# Patient Record
Sex: MALE | HISPANIC OR LATINO | Employment: FULL TIME | URBAN - METROPOLITAN AREA
[De-identification: names, ages, dates, MRNs, and addresses within clinical notes are randomized per-mention and may not be internally consistent; named-entity substitution may affect disease eponyms.]

---

## 2020-10-02 ENCOUNTER — OFFICE VISIT (OUTPATIENT)
Dept: URGENT CARE | Facility: URGENT CARE | Age: 25
End: 2020-10-02
Payer: OTHER GOVERNMENT

## 2020-10-02 VITALS
WEIGHT: 140 LBS | OXYGEN SATURATION: 93 % | TEMPERATURE: 97.4 F | DIASTOLIC BLOOD PRESSURE: 73 MMHG | HEART RATE: 110 BPM | SYSTOLIC BLOOD PRESSURE: 121 MMHG

## 2020-10-02 DIAGNOSIS — R79.81 BORDERLINE LOW O2 SATURATION: ICD-10-CM

## 2020-10-02 DIAGNOSIS — R06.02 SOB (SHORTNESS OF BREATH): ICD-10-CM

## 2020-10-02 DIAGNOSIS — J45.41 MODERATE PERSISTENT ASTHMA WITH EXACERBATION: Primary | ICD-10-CM

## 2020-10-02 DIAGNOSIS — Z20.822 SUSPECTED COVID-19 VIRUS INFECTION: ICD-10-CM

## 2020-10-02 PROCEDURE — U0003 INFECTIOUS AGENT DETECTION BY NUCLEIC ACID (DNA OR RNA); SEVERE ACUTE RESPIRATORY SYNDROME CORONAVIRUS 2 (SARS-COV-2) (CORONAVIRUS DISEASE [COVID-19]), AMPLIFIED PROBE TECHNIQUE, MAKING USE OF HIGH THROUGHPUT TECHNOLOGIES AS DESCRIBED BY CMS-2020-01-R: HCPCS | Performed by: INTERNAL MEDICINE

## 2020-10-02 PROCEDURE — 99204 OFFICE O/P NEW MOD 45 MIN: CPT | Performed by: INTERNAL MEDICINE

## 2020-10-02 RX ORDER — PREDNISONE 20 MG/1
40 TABLET ORAL DAILY
Qty: 10 TABLET | Refills: 0 | Status: SHIPPED | OUTPATIENT
Start: 2020-10-02 | End: 2020-10-07

## 2020-10-02 RX ORDER — ALBUTEROL SULFATE 90 UG/1
2 AEROSOL, METERED RESPIRATORY (INHALATION) EVERY 6 HOURS
Qty: 18 G | Refills: 0 | Status: SHIPPED | OUTPATIENT
Start: 2020-10-02

## 2020-10-02 RX ORDER — ALBUTEROL SULFATE 0.83 MG/ML
2.5 SOLUTION RESPIRATORY (INHALATION) EVERY 6 HOURS PRN
Qty: 1 BOX | Refills: 0 | Status: SHIPPED | OUTPATIENT
Start: 2020-10-02

## 2020-10-02 RX ORDER — FLUTICASONE PROPIONATE AND SALMETEROL 113; 14 UG/1; UG/1
1 POWDER, METERED RESPIRATORY (INHALATION) 2 TIMES DAILY
Qty: 1 INHALER | Refills: 0 | Status: SHIPPED | OUTPATIENT
Start: 2020-10-02

## 2020-10-02 ASSESSMENT — ENCOUNTER SYMPTOMS
STRIDOR: 1
HEADACHES: 0
ADENOPATHY: 0
COUGH: 1
MYALGIAS: 0
FEVER: 0
DIARRHEA: 0
EYE DISCHARGE: 0
PALPITATIONS: 0
WHEEZING: 1
DIZZINESS: 0
SLEEP DISTURBANCE: 0
RHINORRHEA: 0

## 2020-10-03 NOTE — PROGRESS NOTES
SUBJECTIVE:   Jez Infante is a 25 year old male presenting with a chief complaint of   Chief Complaint   Patient presents with     Urgent Care     Asthma     c/o SOB and refills       He is a new patient of [facility].    Adult    Onset of symptoms was 2 day(s) ago.  Course of illness is worsening.      Current and Associated symptoms: shortness of breath  Treatment measures tried include Inhaler (name: albuterol).    Predisposing factors include ill contact: none, seasonal allergies and HX of asthma.    Occasional vaping    Review of Systems   Constitutional: Negative for fever.   HENT: Negative for rhinorrhea.    Eyes: Negative for discharge.   Respiratory: Positive for cough (mild productive), wheezing and stridor.    Cardiovascular: Negative for chest pain and palpitations.   Gastrointestinal: Negative for diarrhea.   Musculoskeletal: Negative for myalgias.   Skin: Negative for rash.   Allergic/Immunologic: Positive for environmental allergies (not bothering him).   Neurological: Negative for dizziness and headaches.   Hematological: Negative for adenopathy.   Psychiatric/Behavioral: Negative for sleep disturbance.       Past Medical History:   Diagnosis Date     Moderate persistent asthma with acute exacerbation      Family History   Problem Relation Age of Onset     Asthma Other         cousin     Current Outpatient Medications   Medication Sig Dispense Refill     albuterol (PROAIR HFA/PROVENTIL HFA/VENTOLIN HFA) 108 (90 Base) MCG/ACT inhaler Inhale 2 puffs into the lungs every 6 hours 18 g 0     albuterol (PROVENTIL) (2.5 MG/3ML) 0.083% neb solution Take 1 vial (2.5 mg) by nebulization every 6 hours as needed for shortness of breath / dyspnea or wheezing 1 Box 0     fluticasone-salmeterol (AIRDUO RESPICLICK) 113-14 MCG/ACT inhaler Inhale 1 puff into the lungs 2 times daily 1 Inhaler 0     predniSONE (DELTASONE) 20 MG tablet Take 2 tablets (40 mg) by mouth daily for 5 days 10 tablet 0     Social History      Tobacco Use     Smoking status: Never Smoker     Smokeless tobacco: Never Used     Tobacco comment: vaping   Substance Use Topics     Alcohol use: Not on file       OBJECTIVE  /73   Pulse 110   Temp 97.4  F (36.3  C) (Oral)   Wt 63.5 kg (140 lb)   SpO2 93%   Recheck o2 sats 93-95%, fluctuating.  Physical Exam  Vitals signs reviewed.   Constitutional:       General: He is not in acute distress.     Appearance: Normal appearance. He is not ill-appearing, toxic-appearing or diaphoretic.   HENT:      Right Ear: Tympanic membrane normal.      Left Ear: Tympanic membrane normal.      Nose: Nose normal.      Mouth/Throat:      Mouth: Mucous membranes are moist.      Pharynx: Oropharynx is clear.   Eyes:      Conjunctiva/sclera: Conjunctivae normal.   Cardiovascular:      Rate and Rhythm: Normal rate and regular rhythm.      Pulses: Normal pulses.      Heart sounds: Normal heart sounds.   Pulmonary:      Breath sounds: Wheezing present.      Comments: Tight air sounds  Skin:     Findings: No rash.   Neurological:      General: No focal deficit present.      Mental Status: He is alert.   Psychiatric:         Mood and Affect: Mood normal.         Labs:  No results found for this or any previous visit (from the past 24 hour(s)).    X-Ray was not done.    ASSESSMENT:      ICD-10-CM    1. Moderate persistent asthma with exacerbation  J45.41 predniSONE (DELTASONE) 20 MG tablet     fluticasone-salmeterol (AIRDUO RESPICLICK) 113-14 MCG/ACT inhaler     albuterol (PROAIR HFA/PROVENTIL HFA/VENTOLIN HFA) 108 (90 Base) MCG/ACT inhaler     albuterol (PROVENTIL) (2.5 MG/3ML) 0.083% neb solution     Symptomatic COVID-19 Virus (Coronavirus) by PCR   2. Borderline low O2 saturation  R79.81    3. SOB (shortness of breath)  R06.02    4. Suspected COVID-19 virus infection  Z20.828         Medical Decision Making:  Severe asthma exacerbation with difficulty breathing and O2 sats fluctuating 93% mainly  Due to symptoms of shortness  of breath COVID testing performed      PLAN:   Prednisone 5-day burst  Prescribed daily asthma medicine.  Concern for insurance coverage.  States he may have just gotten [facility]  Albuterol inhaler  Alb neb prescription (has neb machine available) - new tubing dispensed      Followup:  Next week      Patient Instructions         Albuterol inhaler 2 puffs every 4-6 hours as needed.  Prednisone 40 mg 5 day burst  Daily airduo like advair    Check [facility].    Recheck next week with Alfonso GAVIRIA    To ER if not improved over next 1-2 days, sooner if worse or concerns.

## 2020-10-03 NOTE — PATIENT INSTRUCTIONS
Albuterol inhaler 2 puffs every 4-6 hours as needed.  Prednisone 40 mg 5 day burst  Daily airduo like advair    Check [facility].    Recheck next week with Alfonso GAVIRIA    To ER if not improved over next 1-2 days, sooner if worse or concerns.

## 2020-10-04 LAB
SARS-COV-2 RNA SPEC QL NAA+PROBE: NOT DETECTED
SPECIMEN SOURCE: NORMAL

## 2021-06-26 ENCOUNTER — HOSPITAL ENCOUNTER (EMERGENCY)
Facility: CLINIC | Age: 26
Discharge: HOME | End: 2021-06-26
Attending: EMERGENCY MEDICINE | Admitting: EMERGENCY MEDICINE
Payer: COMMERCIAL

## 2021-06-26 VITALS
DIASTOLIC BLOOD PRESSURE: 78 MMHG | TEMPERATURE: 100.3 F | HEART RATE: 70 BPM | SYSTOLIC BLOOD PRESSURE: 116 MMHG | RESPIRATION RATE: 18 BRPM | OXYGEN SATURATION: 99 %

## 2021-06-26 DIAGNOSIS — G40.909 SEIZURE DISORDER (H): ICD-10-CM

## 2021-06-26 DIAGNOSIS — Z20.822 COVID-19 RULED OUT BY LABORATORY TESTING: ICD-10-CM

## 2021-06-26 LAB
AMPHETAMINES UR QL SCN: NEGATIVE
ANION GAP SERPL CALCULATED.3IONS-SCNC: 9 MMOL/L (ref 3–14)
BARBITURATES UR QL: NEGATIVE
BASOPHILS # BLD AUTO: 0.1 10E9/L (ref 0–0.2)
BASOPHILS NFR BLD AUTO: 0.3 %
BENZODIAZ UR QL: NEGATIVE
BUN SERPL-MCNC: 13 MG/DL (ref 7–30)
CALCIUM SERPL-MCNC: 8.4 MG/DL (ref 8.5–10.1)
CANNABINOIDS UR QL SCN: POSITIVE
CHLORIDE SERPL-SCNC: 107 MMOL/L (ref 94–109)
CO2 SERPL-SCNC: 24 MMOL/L (ref 20–32)
COCAINE UR QL: NEGATIVE
CREAT SERPL-MCNC: 0.86 MG/DL (ref 0.66–1.25)
DIFFERENTIAL METHOD BLD: ABNORMAL
EOSINOPHIL # BLD AUTO: 0.2 10E9/L (ref 0–0.7)
EOSINOPHIL NFR BLD AUTO: 1.4 %
ERYTHROCYTE [DISTWIDTH] IN BLOOD BY AUTOMATED COUNT: 13.2 % (ref 10–15)
ETHANOL UR QL SCN: NEGATIVE
GFR SERPL CREATININE-BSD FRML MDRD: >90 ML/MIN/{1.73_M2}
GLUCOSE SERPL-MCNC: 90 MG/DL (ref 70–99)
HCT VFR BLD AUTO: 41.8 % (ref 40–53)
HGB BLD-MCNC: 14.2 G/DL (ref 13.3–17.7)
IMM GRANULOCYTES # BLD: 0.1 10E9/L (ref 0–0.4)
IMM GRANULOCYTES NFR BLD: 0.4 %
LABORATORY COMMENT REPORT: NORMAL
LYMPHOCYTES # BLD AUTO: 1.3 10E9/L (ref 0.8–5.3)
LYMPHOCYTES NFR BLD AUTO: 8.9 %
MCH RBC QN AUTO: 29.8 PG (ref 26.5–33)
MCHC RBC AUTO-ENTMCNC: 34 G/DL (ref 31.5–36.5)
MCV RBC AUTO: 88 FL (ref 78–100)
MONOCYTES # BLD AUTO: 1.2 10E9/L (ref 0–1.3)
MONOCYTES NFR BLD AUTO: 8.1 %
NEUTROPHILS # BLD AUTO: 12 10E9/L (ref 1.6–8.3)
NEUTROPHILS NFR BLD AUTO: 80.9 %
NRBC # BLD AUTO: 0 10*3/UL
NRBC BLD AUTO-RTO: 0 /100
OPIATES UR QL SCN: NEGATIVE
PLATELET # BLD AUTO: 241 10E9/L (ref 150–450)
POTASSIUM SERPL-SCNC: 3.8 MMOL/L (ref 3.4–5.3)
RBC # BLD AUTO: 4.76 10E12/L (ref 4.4–5.9)
SARS-COV-2 RNA RESP QL NAA+PROBE: NEGATIVE
SODIUM SERPL-SCNC: 140 MMOL/L (ref 133–144)
SPECIMEN SOURCE: NORMAL
WBC # BLD AUTO: 14.8 10E9/L (ref 4–11)

## 2021-06-26 PROCEDURE — 96360 HYDRATION IV INFUSION INIT: CPT | Performed by: EMERGENCY MEDICINE

## 2021-06-26 PROCEDURE — 99284 EMERGENCY DEPT VISIT MOD MDM: CPT | Mod: 25 | Performed by: EMERGENCY MEDICINE

## 2021-06-26 PROCEDURE — 258N000003 HC RX IP 258 OP 636: Performed by: EMERGENCY MEDICINE

## 2021-06-26 PROCEDURE — 99285 EMERGENCY DEPT VISIT HI MDM: CPT | Performed by: EMERGENCY MEDICINE

## 2021-06-26 PROCEDURE — 80320 DRUG SCREEN QUANTALCOHOLS: CPT | Performed by: EMERGENCY MEDICINE

## 2021-06-26 PROCEDURE — 80048 BASIC METABOLIC PNL TOTAL CA: CPT | Performed by: EMERGENCY MEDICINE

## 2021-06-26 PROCEDURE — 85025 COMPLETE CBC W/AUTO DIFF WBC: CPT | Performed by: EMERGENCY MEDICINE

## 2021-06-26 PROCEDURE — C9803 HOPD COVID-19 SPEC COLLECT: HCPCS | Performed by: EMERGENCY MEDICINE

## 2021-06-26 PROCEDURE — 87635 SARS-COV-2 COVID-19 AMP PRB: CPT | Performed by: EMERGENCY MEDICINE

## 2021-06-26 PROCEDURE — 80307 DRUG TEST PRSMV CHEM ANLYZR: CPT | Performed by: EMERGENCY MEDICINE

## 2021-06-26 RX ADMIN — SODIUM CHLORIDE 1000 ML: 9 INJECTION, SOLUTION INTRAVENOUS at 12:37

## 2021-06-26 ASSESSMENT — ENCOUNTER SYMPTOMS
SEIZURES: 1
CONFUSION: 0
MYALGIAS: 0
ABDOMINAL PAIN: 0
SHORTNESS OF BREATH: 0
FEVER: 0

## 2021-06-26 NOTE — ED PROVIDER NOTES
"    [facility] EMERGENCY DEPARTMENT ([facility])       6/26/21  History     Chief Complaint   Patient presents with     Seizures     Pt had witnessed seizure at home last night, had seizure in the past from benzo withdrawal, pts girlfriend concerned and wanted him to come in      The history is provided by the patient and medical records.     Jez Infante is a 25 year old male who presents to the Emergency Department for evaluation after a possible seizure last night. Patient is accompanied to the ED by his girlfriend who witnessed the seizure-like movements last night. She reports that the patient was playing video games last night when he had suddenly dropped his controller started \"shaking\". She reports that the patient became diaphoretic, tremulous, and rigid for approximately 1-2 minutes. She reports the patient was then fairly confused for approximately 5 minutes, and then began to \"come to\". Patient reports he does not remember this yesterday. Patient states that he has had 2 seizures in the past. Patient does report a history of seizures 2/2 benzo withdrawal in the past approximately 2 years ago. He is not completely sure if both of these seizures were secondary to benzodiazepine withdrawal or not. He did have a significant work-up in [place] with neurology approximately 2 years ago. Of note, patient is currently taking Wellbutrin and Zoloft. He reports he has been on Wellbutrin for roughly 1 year. He states that this medication is helpful in giving him more motivation. The patient's girlfriend reports that the patient did stop taking his Wellbutrin due to forgetting a few days last week, and recently restarted towards the end of the week. Patient denies any recent alcohol use, drug use, or benzodiazepine use. Patient reports he is prescribed Valium, and states that he last took this approximately 2 weeks ago. He reports he is prescribed this for anxiety, but does not take this often as he " does not think it is helpful. Patient did not exhibit any tongue biting, loss of bowel/bladder. He reports he just remembers waking up. Patient denies any myalgias. Patient denies any recent stressors, or prolonged lack of sleep. Patient's girlfriend reports that the patient has been trying to find a job which has been increasingly stressful for him. Patient denies any family history of seizures or epilepsy in the past.      I have reviewed the Medications, Allergies, Past Medical and Surgical History, and Social History in the [employer] system.  PAST MEDICAL HISTORY:   Past Medical History:   Diagnosis Date     Moderate persistent asthma with acute exacerbation        PAST SURGICAL HISTORY: No past surgical history on file.    Past medical history, past surgical history, medications, and allergies were reviewed with the patient. Additional pertinent items: None    FAMILY HISTORY:   Family History   Problem Relation Age of Onset     Asthma Other         cousin       SOCIAL HISTORY:   Social History     Tobacco Use     Smoking status: Never Smoker     Smokeless tobacco: Never Used     Tobacco comment: vaping   Substance Use Topics     Alcohol use: Not on file     Social history was reviewed with the patient. Additional pertinent items: None      Patient's Medications   New Prescriptions    No medications on file   Previous Medications    ALBUTEROL (PROAIR HFA/PROVENTIL HFA/VENTOLIN HFA) 108 (90 BASE) MCG/ACT INHALER    Inhale 2 puffs into the lungs every 6 hours    ALBUTEROL (PROVENTIL) (2.5 MG/3ML) 0.083% NEB SOLUTION    Take 1 vial (2.5 mg) by nebulization every 6 hours as needed for shortness of breath / dyspnea or wheezing    FLUTICASONE-SALMETEROL (AIRDUO RESPICLICK) 113-14 MCG/ACT INHALER    Inhale 1 puff into the lungs 2 times daily   Modified Medications    No medications on file   Discontinued Medications    No medications on file        No Known Allergies     Review of Systems   Constitutional: Negative for  fever.   Respiratory: Negative for shortness of breath.    Cardiovascular: Negative for chest pain.   Gastrointestinal: Negative for abdominal pain.   Musculoskeletal: Negative for myalgias.   Skin: Negative for rash.   Neurological: Positive for seizures.   Psychiatric/Behavioral: Negative for confusion and suicidal ideas.   All other systems reviewed and are negative.      Physical Exam   BP: 109/42(checked on right arm.)  Pulse: 107  Temp: 100.3  F (37.9  C)  Resp: 18  SpO2: 97 %      Physical Exam  Vitals signs and nursing note reviewed.   Constitutional:       General: He is not in acute distress.     Appearance: He is not diaphoretic.   HENT:      Head: Normocephalic and atraumatic.   Eyes:      General: No scleral icterus.     Pupils: Pupils are equal, round, and reactive to light.   Cardiovascular:      Heart sounds: Normal heart sounds.   Pulmonary:      Effort: No respiratory distress.      Breath sounds: Normal breath sounds.   Abdominal:      General: Bowel sounds are normal.      Palpations: Abdomen is soft.      Tenderness: There is no abdominal tenderness.   Musculoskeletal: Normal range of motion.         General: No tenderness.   Skin:     General: Skin is warm.      Findings: No rash.   Neurological:      General: No focal deficit present.      Mental Status: He is oriented to person, place, and time. Mental status is at baseline.      Cranial Nerves: No cranial nerve deficit.      Sensory: No sensory deficit.      Coordination: Coordination normal.      Gait: Gait normal.   Psychiatric:         Mood and Affect: Mood normal.         Behavior: Behavior normal.         Thought Content: Thought content normal.         ED Course     12:20 PM  The patient was seen and examined by Maury Olivo MD in Room ED09.        Procedures             The medical record was reviewed and interpreted.  Current labs reviewed and interpreted.  Current images reviewed and interpreted: CT head normal.         No  results found for this or any previous visit (from the past 24 hour(s)).  Medications - No data to display          Assessments & Plan (with Medical Decision Making)   Jez is a 26 yo M with a history of seizures who presents after a witnessed sieuzure. He has a history of seizures related to benzodiazapine withdrawal.  He states he is taking valium, but not in the last 2 weeks.  His lab evlauation was normal.  He had an elevated temperarture of 100.3, but no symptoms of an infection.  There is certainly no evidence of meningitis. A COVID test is pending.  I spoke with Neurology regarding his seizures.  Antiepileptics were not recommended given his history of provoked seizures.  He was instructed to avoid benzodiazepines and he will follow up with Neurology.      I have reviewed the nursing notes.    I have reviewed the findings, diagnosis, plan and need for follow up with the patient.    New Prescriptions    No medications on file       Final diagnoses:   None       Tadeo BERNARD am serving as a trained medical scribe to document services personally performed by Maury Olivo MD, based on the provider's statements to me.      Maury BERNARD MD, was physically present and have reviewed and verified the accuracy of this note documented by Tadeo Desai.     6/26/2021   [facility] EMERGENCY DEPARTMENT     Maury Olivo MD  06/26/21 0669

## 2021-07-06 ENCOUNTER — VIRTUAL VISIT (OUTPATIENT)
Dept: NEUROLOGY | Facility: CLINIC | Age: 26
End: 2021-07-06
Payer: COMMERCIAL

## 2021-07-06 DIAGNOSIS — Z53.9 NO SHOW: Primary | ICD-10-CM

## 2021-07-06 NOTE — LETTER
2021       RE: Jez Infante  : 1995   MRN: [id number]      Dear Colleague,    Thank you for referring your patient, Jez Infante, to the [facility] EPILEPSY [facility]ARE at [facility]. Please see a copy of my visit note below.    Called patient, unable to leave a message, mailbox is full.    Unable to leave a message, patient's mailbox is full.    Called patient,  Unable to leave a message patient's mailbox on his cell phone is full.      Again, thank you for allowing me to participate in the care of your patient.      Sincerely,    Ai Bran MD

## 2021-08-11 ENCOUNTER — TRANSFERRED RECORDS (OUTPATIENT)
Dept: HEALTH INFORMATION MANAGEMENT | Facility: CLINIC | Age: 26
End: 2021-08-11

## 2021-08-19 ENCOUNTER — HOSPITAL ENCOUNTER (EMERGENCY)
Facility: CLINIC | Age: 26
Discharge: HOME | End: 2021-08-19
Attending: EMERGENCY MEDICINE | Admitting: EMERGENCY MEDICINE
Payer: COMMERCIAL

## 2021-08-19 VITALS
DIASTOLIC BLOOD PRESSURE: 68 MMHG | OXYGEN SATURATION: 100 % | RESPIRATION RATE: 16 BRPM | SYSTOLIC BLOOD PRESSURE: 108 MMHG | HEART RATE: 106 BPM | TEMPERATURE: 98 F

## 2021-08-19 DIAGNOSIS — F10.20 ACUTE ALCOHOLISM (H): ICD-10-CM

## 2021-08-19 DIAGNOSIS — F32.9 MAJOR DEPRESSIVE DISORDER, SINGLE EPISODE, UNSPECIFIED: ICD-10-CM

## 2021-08-19 DIAGNOSIS — F19.10 POLYSUBSTANCE ABUSE (H): ICD-10-CM

## 2021-08-19 DIAGNOSIS — F41.1 GENERALIZED ANXIETY DISORDER: ICD-10-CM

## 2021-08-19 DIAGNOSIS — F12.10 CANNABIS ABUSE, CONTINUOUS: ICD-10-CM

## 2021-08-19 LAB
ALCOHOL BREATH TEST: 0 (ref 0–0.01)
AMPHETAMINES UR QL SCN: ABNORMAL
BARBITURATES UR QL: ABNORMAL
BENZODIAZ UR QL: ABNORMAL
CANNABINOIDS UR QL SCN: ABNORMAL
COCAINE UR QL: ABNORMAL
HOLD SPECIMEN: NORMAL
OPIATES UR QL SCN: ABNORMAL

## 2021-08-19 PROCEDURE — 99285 EMERGENCY DEPT VISIT HI MDM: CPT | Mod: 25

## 2021-08-19 PROCEDURE — 250N000013 HC RX MED GY IP 250 OP 250 PS 637: Performed by: EMERGENCY MEDICINE

## 2021-08-19 PROCEDURE — 82075 ASSAY OF BREATH ETHANOL: CPT

## 2021-08-19 PROCEDURE — 80307 DRUG TEST PRSMV CHEM ANLYZR: CPT | Performed by: EMERGENCY MEDICINE

## 2021-08-19 PROCEDURE — 99284 EMERGENCY DEPT VISIT MOD MDM: CPT | Performed by: EMERGENCY MEDICINE

## 2021-08-19 PROCEDURE — 90791 PSYCH DIAGNOSTIC EVALUATION: CPT

## 2021-08-19 RX ORDER — GABAPENTIN 300 MG/1
300 CAPSULE ORAL 3 TIMES DAILY PRN
COMMUNITY
Start: 2021-08-16

## 2021-08-19 RX ORDER — SERTRALINE HYDROCHLORIDE 100 MG/1
200 TABLET, FILM COATED ORAL
COMMUNITY
Start: 2021-08-09

## 2021-08-19 RX ORDER — ATOMOXETINE 40 MG/1
40 CAPSULE ORAL DAILY
COMMUNITY
Start: 2021-08-17

## 2021-08-19 RX ORDER — GABAPENTIN 300 MG/1
300 CAPSULE ORAL ONCE
Status: COMPLETED | OUTPATIENT
Start: 2021-08-19 | End: 2021-08-19

## 2021-08-19 RX ADMIN — GABAPENTIN 300 MG: 300 CAPSULE ORAL at 12:58

## 2021-08-19 ASSESSMENT — ENCOUNTER SYMPTOMS
CARDIOVASCULAR NEGATIVE: 1
SEIZURES: 0
CONSTITUTIONAL NEGATIVE: 1
PSYCHIATRIC NEGATIVE: 1
GASTROINTESTINAL NEGATIVE: 1
MUSCULOSKELETAL NEGATIVE: 1
RESPIRATORY NEGATIVE: 1

## 2021-08-19 NOTE — ED TRIAGE NOTES
Pt has been using alcohol, kratum, and pot.  Pt wants to get into detox.  Pt last use alcohol last night.  Pt states also that he has a seizure disorder and is not on hi seizure med.

## 2021-08-19 NOTE — ED PROVIDER NOTES
[facility] EMERGENCY DEPARTMENT ([facility])  8/19/21    History     Chief Complaint   Patient presents with     Drug / Alcohol Assessment     HPI  Jez Infante is a 26 year old male with a past medical history significant for seizures related to benzodiazepine withdrawal and asthma who presents to the Emergency Department for a drug and alcohol assessment.  He is not a heavy user of alcohol he has been smoking fentanyl and using kratom.  [facility] was [facility] but was sent here for concerns of possible withdrawal.  Based on my evaluation I do not think he is can have significant withdrawal symptoms he does not drink enough alcohol for that he is also not in withdrawal from a benzodiazepine perspective.  He has had 2 seizures in the past that may have been related to Wellbutrin he was put on gabapentin but is not using it consistently last use was yesterday I will give him his 300 mg dose now I think he is cleared to [facility].          Past Medical History  Past Medical History:   Diagnosis Date     Moderate persistent asthma with acute exacerbation      Rhabdomyolysis     2017; from dehydration     Seizures (H)     last one july 27; possibly r/t wellbutrin per pt doc, has d/c'd med     History reviewed. No pertinent surgical history.  albuterol (PROAIR HFA/PROVENTIL HFA/VENTOLIN HFA) 108 (90 Base) MCG/ACT inhaler  atomoxetine (STRATTERA) 40 MG capsule  fluticasone-salmeterol (AIRDUO RESPICLICK) 113-14 MCG/ACT inhaler  gabapentin (NEURONTIN) 300 MG capsule  sertraline (ZOLOFT) 100 MG tablet  albuterol (PROVENTIL) (2.5 MG/3ML) 0.083% neb solution      No Known Allergies  Family History  Family History   Problem Relation Age of Onset     Asthma Other         cousin     Social History   Social History     Tobacco Use     Smoking status: Current Every Day Smoker     Packs/day: 0.25     Smokeless tobacco: Never Used     Tobacco comment: vaping   Substance Use Topics      Alcohol use: Yes     Comment: 10 40's/week     Drug use: Yes     Types: Opiates, Fentanyl     Comment: kratom, heroin      Past medical history, past surgical history, medications, allergies, family history, and social history were reviewed with the patient. No additional pertinent items.       Review of Systems   Constitutional: Negative.    HENT: Negative.    Respiratory: Negative.    Cardiovascular: Negative.    Gastrointestinal: Negative.    Genitourinary: Negative.    Musculoskeletal: Negative.    Neurological: Negative for seizures.   Psychiatric/Behavioral: Negative.    All other systems reviewed and are negative.    A complete review of systems was performed with pertinent positives and negatives noted in the HPI, and all other systems negative.    Physical Exam   BP: 108/68  Pulse: 106  Temp: 98  F (36.7  C)  Resp: 16  SpO2: 100 %  Physical Exam  Vitals and nursing note reviewed.   Constitutional:       General: He is not in acute distress.     Appearance: He is well-developed.   HENT:      Head: Normocephalic and atraumatic.      Mouth/Throat:      Mouth: Mucous membranes are moist.   Eyes:      General: No scleral icterus.     Conjunctiva/sclera: Conjunctivae normal.      Pupils: Pupils are equal, round, and reactive to light.   Cardiovascular:      Rate and Rhythm: Normal rate and regular rhythm.      Heart sounds: Normal heart sounds.   Pulmonary:      Effort: Pulmonary effort is normal. No respiratory distress.      Breath sounds: Normal breath sounds. No wheezing.   Abdominal:      General: Abdomen is flat.      Palpations: Abdomen is soft.   Musculoskeletal:      Cervical back: Neck supple.   Skin:     General: Skin is warm and dry.   Neurological:      General: No focal deficit present.      Mental Status: He is alert and oriented to person, place, and time.      Cranial Nerves: No cranial nerve deficit.      Deep Tendon Reflexes: Abnormal reflex: Tato Scanlon MD.   Psychiatric:         Mood  and Affect: Mood normal.         Behavior: Behavior normal.         ED Course      Procedures       Medications   gabapentin (NEURONTIN) capsule 300 mg (has no administration in time range)          Results for orders placed or performed during the hospital encounter of 08/19/21   Drug abuse screen 1 urine (ED)     Status: Abnormal   Result Value Ref Range    Amphetamines Urine Screen Negative Screen Negative    Barbiturates Urine Screen Negative Screen Negative    Benzodiazepines Urine Screen Negative Screen Negative    Cannabinoids Urine Screen Positive (A) Screen Negative    Cocaine Urine Screen Negative Screen Negative    Opiates Urine Screen Negative Screen Negative   Extra Red Top Tube     Status: None   Result Value Ref Range    Hold Specimen JIC    Extra Green Top (Lithium Heparin) Tube     Status: None   Result Value Ref Range    Hold Specimen JIC    Extra Purple Top Tube     Status: None   Result Value Ref Range    Hold Specimen JIC    Extra Green Top (Lithium Heparin) ON ICE     Status: None   Result Value Ref Range    Hold Specimen JIC    Alcohol breath test POCT     Status: Normal   Result Value Ref Range    Alcohol Breath Test 0 0.00 - 0.01   Urine Drugs of Abuse Screen     Status: Abnormal    Narrative    The following orders were created for panel order Urine Drugs of Abuse Screen.  Procedure                               Abnormality         Status                     ---------                               -----------         ------                     Drug abuse screen 1 urin...[[id number]]  Abnormal            Final result                 Please view results for these tests on the individual orders.   [place] Draw     Status: None    Narrative    The following orders were created for panel order [place] Draw.  Procedure                               Abnormality         Status                     ---------                               -----------         ------                     Extra Red Top  Tube[[id number]]                               Final result               Extra Green Top (Lithium...[[id number]]                      Final result               Extra Purple Top Tube[[id number]]                            Final result               Extra Green Top (Lithium...[[id number]]                      Final result                 Please view results for these tests on the individual orders.     Medications   gabapentin (NEURONTIN) capsule 300 mg (has no administration in time range)        Assessments & Plan (with Medical Decision Making)   46-year-old male motivated to [facility] he has been abusing alcohol marijuana fentanyl and kratom.  None of these require a detox admission.  I believe he is clear and safe to be [facility].  He should take the gabapentin he was prescribed I gave him 1 300 mg dose in the department and will be discharged with instructions that he is cleared to enter [facility].    I have reviewed the nursing notes. I have reviewed the findings, diagnosis, plan and need for follow up with the patient.    New Prescriptions    No medications on file       Final diagnoses:   None       --  Tato Scanlon MD  [facility] EMERGENCY DEPARTMENT  8/19/2021     Tato Scanlon MD  08/19/21 [id number]

## 2021-08-19 NOTE — ED NOTES
Pt here seeking detox. States was referred to [facility] who requested pt first come here for detox. Former heroin user, quit in 2020, clean for over a year, recent relapse July 3-5 on heroin and fentanyl.
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
## 2021-08-19 NOTE — DISCHARGE INSTRUCTIONS
Jez is medically [facility].  It is very unlikely that he will experience significant withdrawal symptoms

## 2021-08-23 ENCOUNTER — HOSPITAL ENCOUNTER (OUTPATIENT)
Dept: BEHAVIORAL HEALTH | Facility: CLINIC | Age: 26
End: 2021-08-23
Attending: FAMILY MEDICINE
Payer: COMMERCIAL

## 2021-08-23 VITALS
TEMPERATURE: 98.1 F | HEIGHT: 70 IN | WEIGHT: 135 LBS | SYSTOLIC BLOOD PRESSURE: 114 MMHG | BODY MASS INDEX: 19.33 KG/M2 | HEART RATE: 100 BPM | OXYGEN SATURATION: 100 % | DIASTOLIC BLOOD PRESSURE: 73 MMHG

## 2021-08-23 DIAGNOSIS — G40.909 RECURRENT SEIZURES (H): Primary | ICD-10-CM

## 2021-08-23 DIAGNOSIS — F17.200 NICOTINE DEPENDENCE: Primary | ICD-10-CM

## 2021-08-23 PROBLEM — F19.20 CHEMICAL DEPENDENCY (H): Status: ACTIVE | Noted: 2021-08-23

## 2021-08-23 LAB — SARS-COV-2 RNA RESP QL NAA+PROBE: NEGATIVE

## 2021-08-23 PROCEDURE — H2035 A/D TX PROGRAM, PER HOUR: HCPCS

## 2021-08-23 PROCEDURE — U0005 INFEC AGEN DETEC AMPLI PROBE: HCPCS | Performed by: FAMILY MEDICINE

## 2021-08-23 PROCEDURE — H2035 A/D TX PROGRAM, PER HOUR: HCPCS | Mod: HQ

## 2021-08-23 RX ORDER — LORATADINE 10 MG/1
10 TABLET ORAL DAILY
COMMUNITY

## 2021-08-23 RX ORDER — IBUPROFEN 200 MG
400 TABLET ORAL EVERY 6 HOURS PRN
COMMUNITY

## 2021-08-23 RX ORDER — MAGNESIUM HYDROXIDE/ALUMINUM HYDROXICE/SIMETHICONE 120; 1200; 1200 MG/30ML; MG/30ML; MG/30ML
30 SUSPENSION ORAL EVERY 6 HOURS PRN
COMMUNITY

## 2021-08-23 RX ORDER — LANOLIN ALCOHOL/MO/W.PET/CERES
3 CREAM (GRAM) TOPICAL
COMMUNITY

## 2021-08-23 RX ORDER — AMOXICILLIN 250 MG
2 CAPSULE ORAL DAILY PRN
COMMUNITY

## 2021-08-23 RX ORDER — LEVETIRACETAM 500 MG/1
500 TABLET ORAL 2 TIMES DAILY
Qty: 20 TABLET | Refills: 0 | Status: SHIPPED | OUTPATIENT
Start: 2021-08-23

## 2021-08-23 RX ORDER — ACETAMINOPHEN 325 MG/1
325-650 TABLET ORAL EVERY 4 HOURS PRN
COMMUNITY

## 2021-08-23 ASSESSMENT — ANXIETY QUESTIONNAIRES
6. BECOMING EASILY ANNOYED OR IRRITABLE: MORE THAN HALF THE DAYS
GAD7 TOTAL SCORE: 15
2. NOT BEING ABLE TO STOP OR CONTROL WORRYING: NEARLY EVERY DAY
1. FEELING NERVOUS, ANXIOUS, OR ON EDGE: NEARLY EVERY DAY
IF YOU CHECKED OFF ANY PROBLEMS ON THIS QUESTIONNAIRE, HOW DIFFICULT HAVE THESE PROBLEMS MADE IT FOR YOU TO DO YOUR WORK, TAKE CARE OF THINGS AT HOME, OR GET ALONG WITH OTHER PEOPLE: VERY DIFFICULT
7. FEELING AFRAID AS IF SOMETHING AWFUL MIGHT HAPPEN: MORE THAN HALF THE DAYS
4. TROUBLE RELAXING: SEVERAL DAYS
3. WORRYING TOO MUCH ABOUT DIFFERENT THINGS: SEVERAL DAYS
5. BEING SO RESTLESS THAT IT IS HARD TO SIT STILL: NEARLY EVERY DAY

## 2021-08-23 ASSESSMENT — MIFFLIN-ST. JEOR: SCORE: 1593.61

## 2021-08-23 ASSESSMENT — PATIENT HEALTH QUESTIONNAIRE - PHQ9: SUM OF ALL RESPONSES TO PHQ QUESTIONS 1-9: 12
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
## 2021-08-23 NOTE — PROGRESS NOTES
At 1340 Pt came to this writer in the 6th floor office and said  Bro, I need my cell phone because I am out . Pt said he is going to his girl place. Pt request staff to keep his small suitcase bag for a day. Writer put his bag in 6th floor belonging room with his name sticker on it. Pt will call the coordinator phone number to . Pt left [facility] unit at 1350. Notified to LPRN and counselor.

## 2021-08-23 NOTE — PROGRESS NOTES
Restarted keppra @ 500mg BID - see ED note from Aug 2 regarding unprovoked seizure hx. Possibly related in the past to BZD withdrawal, wellbutrin and/or gabapentin use. No seizures reported since Aug 2. Last use of substances was xanax 3 days ago, and he hasn't taken this consistently over the past month. BZD screen negative today. EtOH screen negative on 8/19 and again today.    Will need f/up with PCP or neurology ASAP to address ongoing use of keppra. Given his current undifferentiated workup, it would not be safe for him to admit without seizure ppx. 10d Rx keppra written, same dose/indications (500mg BID) as was determined at his ED visit Aug 2. He reports losing this Rx and never started this, despite a loading dose of IV keppra in the ED that day.    Antonio Rogers MD

## 2021-08-23 NOTE — GROUP NOTE
Group Therapy Documentation    PATIENT'S NAME: Jez Infante  MRN:   [id number]  :   1995  ACCT. NUMBER: [id number]  DATE OF SERVICE: 21  START TIME: 12:30 PM  END TIME:  2:30 PM  FACILITATOR(S): Rafa Crowley LADC  TOPIC: BEH Group Therapy  Number of patients attending the group:  4  Group Length:  2 Hours    Group Therapy Type: Recovery strategies    Summary of Group / Topics Discussed:    Recovery Principles and Emotions/expression      Group Attendance:  Attended group session for 1 of 2 hours    Patient's response to the group topic/interactions:  cooperative with task    Patient appeared to be Passively engaged.        Client specific details:  Pt was present for 1 of 2 hours of group.

## 2021-08-23 NOTE — PROGRESS NOTES
Progress Note    This patient had a Rule 25/31 Combined assessment form  on 08/12/2021 completed by [facility]  This patient was seen for a face to face update of the Rule 25/31 Combined Assessment Form on 8/23/2021 by FEMI Moe.  OUTSIDE: A paper copy of the Rule 25/31 Combined Assessment Form will be placed in the patient's paper chart until being scanned into the patient's electronic medical record in Epic under the Media tab.    Alcohol/Drug use since the last CD evaluation (include date of last use):     Pt reported his last use of Cannabis was on 8/21/2021, and last use of alcohol was on 8/20/2021. He denies any current withdrawal symptoms.      Please note any other clinical changes since the last CD evaluation (such as medication changes, additional legal charges, detoxification admissions, overdoses, etc.)     No significant changes since the last CD evaluation       ASAM Dimensions Original scores Current Scores   I.) Intoxication and Withdrawal: 1 0   II.) Biomedical:  1 1   III.) Emotional and Behavioral:  2 2   IV.) Readiness to Change:  3 3   V.) Relapse Potential: 4 4   VI.) Recovery Environmental: 4 4     Please list clinical justifications for the above ASAM score changes since the original comprehensive assessment:     The patient's score on Dimension I changed from a 1 to a 0.  The patient had not consumed any alcohol or drugs since 8/21/2021 and he does not appear to have any current risk of having significant withdrawal symptoms.        Current SUNG: Current UA:     0.000     Positive for THC and negative for all other screened drugs.       PHQ-9, BONITA-7   PHQ-9 on 8/23/2021 BONITA-7 on 8/23/2021   The patient's PHQ-9 score was 12 out of 27, indicating moderate depression.   The patient's BONITA-7 score was 15 out of 21, indicating severe anxiety.       [place]-Suicide Severity Rating Scale Reassessment   Have you ever wished you were dead or that you could go to sleep and not wake  up?  Past Month:  No     Have you actually had any thoughts of killing yourself?  Past Month:  No     Have you been thinking about how you might do this?     Past Month:  No   Lifetime:  No   Have you had these thoughts and had some intention of acting on them?     Past Month:  No   Lifetime:  No   Have you started to work out the details of how to kill yourself?   Past Month:  No   Lifetime:  No   Do you intend to carry out this plan?   No     When you have the thoughts how long do they last?  The patient denied ever having any suicidal thoughts in life.     Are there things - anyone or anything (i.e. family, Mu-ism, pain of death) that stopped you from wanting to die or acting on thoughts of suicide?  Does not apply       2008  [employer]  Used with permission by Telma Bryan, PhD.       Guide to C-SSRS Risk Ratings   NO IDEATION:  with no active thoughts IDEATION: with a wish to die. IDEATION: with active thoughts. Risk Ratings   If Yes No No 0 - Very Low Risk   If NA Yes No 1 - Low Risk   If NA Yes Yes 2 - Low/moderate risk   IDEATION: associated thoughts of methods without intent or plan INTENT: Intent to follow through on suicide PLAN: Plan to follow through on suicide Risk Ratings cont...   If Yes No No 3 - Moderate Risk   If Yes Yes No 4 - High Risk   If Yes Yes Yes 5 - High Risk   The patient's ADDITIONAL RISK FACTORS and lack of PROTECTIVE FACTORS may increase their overall suicide risk ratings.     Additional Risk Factors:    Significant history of having untreated or poorly treated mental health symptoms     Tendency to be socially isolated and/or cut off from the support of others     Significant history of trauma and/or abuse issues   Protective Factors:    Having people in his/her life that would prevent the patient from considering a suicide attempt (i.e. young children, spouse, parents, etc.)     Having pet(s) that give companionship and/or would prevent the  "patient from considering a suicide attempt     An absence of chronic health problems or stable and well treated chronic health issues     A positive relationship with his/her clinical medical and/or mental health providers     Having easy access to supportive family members     Having a good community support network     Having cultural, Gnosticist or spiritual beliefs that discourage suicide     Having restricted access to highly lethal means of suicide     Risk Status   0. - Very Low Risk:  Evaluation Counselors:  Document in Epic / SBAR to counselor \"Very Low Risk\".      Treatment Counselors:  Reassess upon admission as applicable, assess weekly in progress notes under Dimension 3 and summarize in Discharge / Treatment summary under Dimension 3.     Additional information to support suicide risk rating: There was no additional information to provide at this time.     Substance Use History Update:           X = Primary Drug Used   Age of First Use Most Recent Pattern of Use and Duration   Need enough information to show pattern (both frequency and amounts) and to show tolerance for each chemical that has a diagnosis   Date of last use and time, if needed   Withdrawal Potential? Requiring special care Method of use  (oral, smoked, snort, IV, etc)      Alcohol     14 CU: 3-4 40 oz beers a week    HU: 2 pints of vodka daily 4 years ago.   8/20/2021 no oral      Marijuana/  Hashish   14 CU: 1/2 oz per week, daily use    HU: 1 oz per week, daily use 4 years ago 8/21/2021 no smoke      Cocaine/Crack     No use          Meth/  Amphetamines   No use          Heroin     19 CU: July 4 relapse, but have not used since.    HU: 1 bundle per day, 2 years ago 7/4/2021 no smoke      Other Opiates/  Synthetics   18 Used Oxy prior to using heroin 5 years ago no snort      Inhalants     No use          Benzodiazepines     17 CU: no use    HU: 2015 5 xanax bars a day 6 years ago no oral      Hallucinogens     14 5 years ago: LSD and " mushrooms, every 3 months 2016 19 oral      Barbiturates/  Sedatives/  Hypnotics No use          Over-the-Counter Drugs   No use          Other     No use          Nicotine     14 CU: Vape a lot    HU: 1 pack per day, 5 years ago 08/23/2021 yes smoke

## 2021-08-23 NOTE — PROGRESS NOTES
Per LPRN pt cannot be readmitted to [facility] until he has completed a neurology referral as recommended in in recent ED visit. Please have pt medically screened if he is ever interested in re-admittng to [facility]
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
## 2021-08-23 NOTE — PROGRESS NOTES
Visit Date: 08/23/2021    EVALUATION COUNSELOR:  Denzel Katz [facility]    TREATMENT COUNSELOR:  KOLE Hernandez, [facility]    REFERRAL SOURCE:  Self.     Program [facility] program admission date 08/23/2021, discharge date 08/23/2021.     ADMISSION DIAGNOSES:  Alcohol use disorder, severe, 303.90/F10.20; cannabis use disorder, severe, 304.30/F12.20; opioid use disorder, severe 304.00/F11.20.    DISCHARGE DIAGNOSES:  Alcohol use disorder, severe, 303.90/F10.20; cannabis use disorder, severe, 304.30/F12.20; opioid use disorder, severe 304.00/F11.20.    DISCHARGE STATUS:  The patient left the program after 1 hour in the program AMA.  Last use date as client reported 08/23/2021.  Days of treatment completed, 0.    PRESENTING INFORMATION:  The patient was referred to the [facility] for chemical dependency treatment.  The patient was admitted on the morning of 08/23/2021.  The patient entered group therapy for 1 hour, left group, never to return and went to the [facility] staff informing them that he was leaving the program AMA.    SERVICES PROVIDED:  Services included assessment and treatment planning.    ISSUES ADDRESSED IN TREATMENT:  No issues were addressed in treatment since the patient was in the program 1 hour.     STRENGTHS:  None shown.    PROGNOSIS:  Unfavorable.    LIVING ARRANGEMENTS AT DISCHARGE:  The patient stated that he had a safe place to go and he would be seeking an easier treatment program.    CONTINUING CARE RECOMMENDATIONS AND REFERRALS:    1.  The patient needs to enter a long-term chemical dependency treatment program.  2.  Per nurse's instructions, the patient cannot be readmitted to the [facility] program until he has completed a Neurology referral as recommended in the recent Emergency Department visit.      This information has been disclosed to you from records protected by Federal confidentiality rules (42 CFR part 2). The Federal rules  prohibit you from making any further disclosure of this information unless further disclosure is expressly permitted by the written consent of the person to whom it pertains or as otherwise permitted by 42 CFR part 2. A general authorization for the release of medical or other information is NOT sufficient for this purpose. The Federal rules restrict any use of the information to criminally investigate or prosecute any alcohol or drug abuse patient.    KOLE Mayers, [facility]        D: 2021   T: 2021   MT: [id number]    Name:     HOMA GONZALEZ MARI  MRN:      [id number]        Account:    [id number]   :      1995           Visit Date: 2021     Document: [id number]

## 2021-08-23 NOTE — PROGRESS NOTES
Name: Jez Infante  Date: 8/23/2021  Medical Record: [id number]  Envelope Number: [id number]  List of Contents (List each item separately in new row):   One Cell Phone, One  & Cord.   Admission:  I am responsible for any personal items that are not sent to the safe or pharmacy.  [facility] is not responsible for loss, theft or damage of any property in my possession.    Patient Signature:  ___________________________________________       Date/Time:__________________________    Staff Signature: __________________________________       Date/Time:__________________________    2nd Staff person, if patient is unable/unwilling to sign:      __________________________________________________________       Date/Time: __________________________    Discharge:  [facility] has returned all of my personal belongings:    Patient Signature: ________________________________________     Date/Time: ____________________________________    Staff Signature: ______________________________________     Date/Time:_____________________________________

## 2021-08-23 NOTE — PROGRESS NOTES
Initial Services Plan        Service Initiation Date: 8/23/2021    Immediate health and/or safety concerns: No    Identify health and safety concern(s) below and include plan to address:    None Identified    Treatment suggestions for client during the time between intake (admit date) and completion of the individual treatment plan:     Look for a sober support network, i.e. 12 step, [facility], [facility], etc  Tour the treatment center or outpatient clinic  Introduce yourself to your treatment group. Spend time getting to know your peers  Review your patient or client handbook  Begin working on your treatment goal list     Completed by: FEMI Moe  Date completed: 8/23/2021 at 10:49 AM

## 2021-08-23 NOTE — PROGRESS NOTES
Pt.informed staff that he would be leaving program AMA. He stated he will be looking for a program that is easy

## 2021-08-23 NOTE — PROGRESS NOTES
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    Dimension Two: Biomedical Condition and Complications    Ratin  (Consider the degree to which any physical disorder would interfere with treatment for substance abuse, and the client's ability to tolerate any related discomfort; determine the impact of continued chemical use on the unborn child if the client is pregnant)     Pt denies medical issues or concerns.    Dimension Three: Emotional/Behavioral/Cognitive Conditions & Complications    Ratin  (Determine the degree to which any condition or complications are likely to interfere with treatment for substance abuse or with functioning in significant life areas and the likelihood of risk of harm to self or others)     He lacks impulse control and sober coping skills. He reports dxs of Panic Disorder and Depression. He takes psychotropic medication which is prescribed by his psychiatrist, Dr. Marie, at [facility]. He does not currently have an individual therapist and may benefit from establishing these services. He denies SI/SIB, was assessed as low risk of suicide upon admission, will continue to be monitored throughout his time at , and will complete a safety plan.    Dimension Four: Treatment Acceptance/Resistance     Rating: 3  (Consider the  amount of support and encouragement necessary to keep the client involved in treatment)     Pt's  states that he demonstrates ambivalence toward treatment and ceasing his use. He has continued to abuse substances despite severe life consequences, such as unemployment and consequences to mental health. He is willing to explore sobriety and treatment. He would benefit from increasing internal motivation. He would benefit from connecting with spirituality and hope.    Dimension Five: Continued Use/Relaspe Prevention     Ratin  (Consider the degree to which the client's recognizes relapse issues and has the skills to prevent relapse of either substance use or mental health problems)     Pt has a long hx of use and chronic lifestyle problems. He reports a hx of 1 treatment in [place]. He states his longest period of sobriety was ~1 year. He lacks awareness of his personal relapse process including warning signs, triggers, and cues. He lacks the ability to arrest his relapse process. He lacks coping skills, impulse control, and long-term sober maintenance skills. Pt has issues of cross-addiction and a hx of self-sabotage. He has co-occurring issues of MH that increase relapse risk. Pt appears to be at high risk of relapse at this time.     Dimension Six: Recovery Environment     Ratin  (Consider the degree to which key areas of the client's life are supportive of or antagonistic to treatment participation and recovery)     Pt is unemployed and not enrolled in school. He lives with his girlfriend in an environment not conducive to sobriety. He lacks involvement in structured and meaningful activity. He lacks sober support and would benefit from attending support meetings and finding a sponsor. He lacks an aftercare plan and would benefit from creating one.    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 Dimension 2 was changed from 1 to 0 because the Pt denies medical issues or concerns.

## 2021-08-23 NOTE — PROGRESS NOTES
[facility] Health Assessment      Vital signs:     /73   Pulse 100   Temp 98.1  F (36.7  C)   SpO2 100%       Direct admission    Counselor: Susan/Tolu  Drug of Choice: THC and xanax (last use Friday took 3 times in the last month)  Last use: THC on saturday  Home clinic/MD:   [facility]    [address]    [place] [address] [phone]   Anamaria Fitch, APRN, CNP    [address]    [place], [place] [address] [phone] (Work)    [phone] (Fax)         Psychiatrist/therapist: Dr. Gons[facility] (notes are not polly. Scheduled for phone appt next week.     Medical history/current conditions:    Moderate persistent asthma with acute exacerbation        Rhabdomyolysis       2017; from dehydration     Seizures (H)       last one july 27; possibly r/t wellbutrin per pt doc, has d/c'd med         H&P Screen:  H&P within the last 90 days: Yes.  Date: 8/19/21 Location: [facility] ED      Mental Health diagnosis: anxiety  Medication compliant?: no, has not been taking his seizure medication as directed  Recent sucidal thoughts? no     When? na  Current thought of self-harm? no    Plan? na    Pain assessment:   Pt. Experiencing pain at this time?  Yes.  Rating on 0-10 scale: (1-10 scale): 4.  Location: Left wrist  Recent  Result of: injury.     [facility] Medical Screen for COVID-19    Have you received COVID-19 Vaccination? Yes    Do you have any of the following NEW or worsening symptoms NOT attributed to pre-existing conditions?    No,     o Fever of 100.0  F (37.8 C) or over  o Chills  o Cough  o Shortness of Breath  o Loss of taste or smell  o Generalized body aches  o Persistent headache  o Sore throat (or trouble eating or drinking in young children?)  o Nausea, Vomiting, or diarrhea (loose stools)    Did you test positive for COVID-19 in the last 14  days or are you waiting on the test results due to an exposure or symptoms?  No,     Has  anyone told you to self-quarantine due to exposure to someone with COVID-19?  No,     Does the above COVID-19 screen need to be reviewed by Infection Prevention? No,     COVID-19 Test completed by [facility] ? Yes     COVID-19 - Pt informed of the following while at :    1)Staff will take temperature and O2Sats once daily    2) Practice good hand washing hygiene and avoid touching face    3) If pt has any of the symptoms below, notify staff immediately.      Fever     Cough     Shortness of breath or difficulty breathing     Chills     Repeated shaking with chills    Muscle pain     4) COVID-19 testing may be initiated more than once during your stay.  Patient will be required to stay in room until lab results confirm negative. If COVID results are positive, You will have to exit the program, quarantine as recommended per CDC and then may return for CD treatment after symptoms have resolved    5) Per COVID protocol, during your stay at , social distancing is required AND mouth and nose must be covered at all times with facial mask while out in milieu.      6) Patients will not be allowed to go to any outside appointments, all outside appointments will need to be virtual or by phone    RN Assessment of Patient's Ability to Safely Manage and Self-Administer Respiratory Treatments    Has experience in the management of Respiratory (If NA, indicate and move to Integrative Therapies): Yes    Including knowledge and understanding of the importance of:    Does pt have any of the following Respiratory Illness/disorders?   Asthma, COPD, RAD, Bronchitis, Emphysema, Cystic fibrosis, LOVE Yes    Which Acute or Chronic Respiratory Illness do you have which requires intervention? Asthma   Did pt bring all prescribed respiratory supplies? CPAP, BiPap, Nebulizer, Nebulizer solution, scheduled inhaler, Rescue Inhaler  Yes   Pt understands they must carry  Rescue Inhalers  at all times Yes   Alerting staff with respiratory symptoms?   Wheezing, SOB, Tightness or pain in chest, Excessive Daytime Sleepiness Yes     Does the patient have the physical and mental ability to:     Perform respiratory cares? CPAP, BiPap, Nebulizer tx, scheduled inhaler, Rescue Inhaler tx, respiratory medicines Yes   Determine when and how often to use respiratory treatments? (CPAP, BiPap, Nebulizer tx, scheduled inhaler, Rescue Inhaler tx, respiratory medicines Yes   Nebulizer/CPAP/BiPAP accessories na   CPAP/BiPAP Therapy settings na     Therefore does the patient, present a risk of harm to themselves or other clients in the facility if allowed to self-administer Respiratory treatments.  Consider factors above.  No    I have assessed the patient to be able to safely administer respiratory treatments.  Yes    Integrative Therapies: Essential Oils      Patient was screened for kidney disease, asthma/reactive airway disease and rashes and wounds or 1st trimester of pregnancy    List Essential Oils requested by pt not offered due to asthma    Patient tobacco use: over a pack a day     Are you interested in quitting? no  NRT (Nicotine Replacement therapy) ordered? yes   Pt is aware of the dangers of tobacco cessation and in contemplation.    Pt given written education.          Nutritional Assessment:    Have you ever purged, binged or restricted yourself as a way to control your weight?   No     Are you on a special diet?   No     Do you have any concerns regarding your nutritional status?   Yes, explain: pt reports weight loss in the context of heavy use     Have you had any appetite changes in the last 3 months?   Yes, explain: not as hungry   Have you had weight loss or weight gain of more than 10 lbs in the last 3 months?   If patient gained or lost more than 10 lbs, then refer to program RN / attending Physician for assessment.   No   Was the patient informed of BMI? 19.37 kg/m      Normal, No Intervention   Yes   Have you engaged in any risk-taking behavior that would  put you at risk for exposure to blood-borne or sexually transmitted diseases?   No   Do you have any dental problems?   No           Nursing Assessment Summary:  Writer has spoken with medical director about concerns related to pt's 8/2 seizure and his non compliance with  Keppra 500 twice daily and neurology follow up. Pt has agreed to take keppra while at [facility] and writer will work with medical director on setting up appropriate medical follow up. Staff will be notified of pt seizure history.       On-going nursing intervention required?   No    Acute care visit recommended: no

## 2021-08-24 ASSESSMENT — ANXIETY QUESTIONNAIRES: GAD7 TOTAL SCORE: 15

## 2023-05-16 ENCOUNTER — OFFICE VISIT (OUTPATIENT)
Dept: URGENT CARE | Facility: URGENT CARE | Age: 28
End: 2023-05-16
Payer: COMMERCIAL

## 2023-05-16 VITALS
RESPIRATION RATE: 16 BRPM | HEART RATE: 91 BPM | HEIGHT: 70 IN | OXYGEN SATURATION: 97 % | TEMPERATURE: 99 F | SYSTOLIC BLOOD PRESSURE: 115 MMHG | WEIGHT: 135 LBS | BODY MASS INDEX: 19.33 KG/M2 | DIASTOLIC BLOOD PRESSURE: 69 MMHG
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
## 2023-05-16 DIAGNOSIS — S61.411A: ICD-10-CM

## 2023-05-16 DIAGNOSIS — L53.9 ERYTHEMATOUS CONDITION: ICD-10-CM

## 2023-05-16 PROCEDURE — 90715 TDAP VACCINE 7 YRS/> IM: CPT | Performed by: PHYSICIAN ASSISTANT

## 2023-05-16 PROCEDURE — 99214 OFFICE O/P EST MOD 30 MIN: CPT | Mod: 25 | Performed by: PHYSICIAN ASSISTANT

## 2023-05-16 PROCEDURE — 90471 IMMUNIZATION ADMIN: CPT | Performed by: PHYSICIAN ASSISTANT

## 2023-05-16 PROCEDURE — 96372 THER/PROPH/DIAG INJ SC/IM: CPT | Performed by: PHYSICIAN ASSISTANT

## 2023-05-16 RX ORDER — CEFTRIAXONE SODIUM 1 G
1 VIAL (EA) INJECTION ONCE
Status: COMPLETED | OUTPATIENT
Start: 2023-05-16 | End: 2023-05-16

## 2023-05-16 RX ORDER — NALOXONE HYDROCHLORIDE 1 MG/ML
INJECTION INTRAMUSCULAR; INTRAVENOUS; SUBCUTANEOUS
COMMUNITY
Start: 2021-07-05

## 2023-05-16 RX ADMIN — Medication 1 G: at 16:38

## 2023-05-16 NOTE — PROGRESS NOTES
SUBJECTIVE:  Jez Infante is a 27 year old male who presents with a chief complaint of an animal bite/scratch on the hands right.  He was bitten by a cat two days ago.   Cicumstances of bite: provoked attack - .  Severity: moderate.  Animal's immunizations up to date   Associated symptoms: swelling and edema at site    tetanus status unknown to the patient    Past Medical History:   Diagnosis Date     Moderate persistent asthma with acute exacerbation      Rhabdomyolysis     2017; from dehydration     Seizures (H)     last one july 27; possibly r/t wellbutrin per pt doc, has d/c'd med       Current Outpatient Medications:      amoxicillin-clavulanate (AUGMENTIN) 875-125 MG tablet, Take 1 tablet by mouth 2 times daily for 7 days, Disp: 14 tablet, Rfl: 0     naloxone (NARCAN) 2 MG/2ML injection, For suspected opioid overdose, spray 1mL in each nostril. Repeat after 3 minutes if no or minimal response., Disp: , Rfl:      acetaminophen (TYLENOL) 325 MG tablet, Take 325-650 mg by mouth every 4 hours as needed for mild pain, Disp: , Rfl:      albuterol (PROAIR HFA/PROVENTIL HFA/VENTOLIN HFA) 108 (90 Base) MCG/ACT inhaler, Inhale 2 puffs into the lungs every 6 hours, Disp: 18 g, Rfl: 0     albuterol (PROVENTIL) (2.5 MG/3ML) 0.083% neb solution, Take 1 vial (2.5 mg) by nebulization every 6 hours as needed for shortness of breath / dyspnea or wheezing, Disp: 1 Box, Rfl: 0     alum & mag hydroxide-simethicone (MAALOX) 200-200-20 MG/5ML SUSP suspension, Take 30 mLs by mouth every 6 hours as needed for indigestion, Disp: , Rfl:      atomoxetine (STRATTERA) 40 MG capsule, Take 40 mg by mouth daily Take one cap by mouth daily for one week then take two caps daily thereafter, Disp: , Rfl:      fluticasone-salmeterol (AIRDUO RESPICLICK) 113-14 MCG/ACT inhaler, Inhale 1 puff into the lungs 2 times daily, Disp: 1 Inhaler, Rfl: 0     gabapentin (NEURONTIN) 300 MG capsule, 300 mg 3 times daily as needed , Disp: , Rfl:       "guaiFENesin (ROBITUSSIN) 20 mg/mL SOLN solution, Take 10 mLs by mouth every 4 hours as needed for cough, Disp: , Rfl:      ibuprofen (ADVIL/MOTRIN) 200 MG tablet, Take 400 mg by mouth every 6 hours as needed for mild pain, Disp: , Rfl:      levETIRAcetam (KEPPRA) 500 MG tablet, Take 1 tablet (500 mg) by mouth 2 times daily, Disp: 20 tablet, Rfl: 0     loratadine (CLARITIN) 10 MG tablet, Take 10 mg by mouth daily, Disp: , Rfl:      melatonin 3 MG tablet, Take 3 mg by mouth nightly as needed for sleep, Disp: , Rfl:      nicotine (NICORETTE) 4 MG gum, Place 1 each (4 mg) inside cheek every hour as needed for smoking cessation, Disp: 110 each, Rfl: 1     phenol-menthol (CEPASTAT) 14.5 MG lozenge, Place 1 lozenge inside cheek every 2 hours as needed for moderate pain, Disp: , Rfl:      senna-docusate (SENOKOT-S/PERICOLACE) 8.6-50 MG tablet, Take 2 tablets by mouth daily as needed for constipation, Disp: , Rfl:      sertraline (ZOLOFT) 100 MG tablet, 200 mg , Disp: , Rfl:   No current facility-administered medications for this visit.    Facility-Administered Medications Ordered in Other Visits:      Self Administer Medications: Behavioral Services, , Does not apply, See Admin Instructions, Antonio Rogers MD  Social History     Tobacco Use     Smoking status: Every Day     Packs/day: 0.25     Types: Cigarettes     Smokeless tobacco: Never     Tobacco comments:     vaping   Vaping Use     Vaping status: Not on file   Substance Use Topics     Alcohol use: Yes     Comment: 10 40's/week       ROS:  Review of systems negative except as stated above.    OBJECTIVE:    /69   Pulse 91   Temp 99  F (37.2  C) (Tympanic)   Resp 16   Ht 1.778 m (5' 10\")   Wt 61.2 kg (135 lb)   SpO2 97%   BMI 19.37 kg/m    GENERAL: healthy, alert no acute distress  SKIN: multiple scratches and biteswarm swollen and red hand  NEURO: Normal strength and tone, sensory exam grossly normal,  normal speech and " mentation    ASSESSMENT:  (L53.9) Erythematous condition  (W55.01XA) Cat bite, initial encounter  (W55.03XA) Cat scratch  (primary encounter diagnosis)  (S61.411A) Cut of hand, right, initial encounter  Plan: cefTRIAXone (ROCEPHIN) in lidocaine 1% (PF) for        IM administration 1 g, TDAP VACCINE (Adacel,         Boostrix)  [[id number]], amoxicillin-clavulanate         (AUGMENTIN) 875-125 MG tablet      ED advised and declined AMA, Patient requests home trial ABs